# Patient Record
Sex: MALE | Race: WHITE | NOT HISPANIC OR LATINO | URBAN - METROPOLITAN AREA
[De-identification: names, ages, dates, MRNs, and addresses within clinical notes are randomized per-mention and may not be internally consistent; named-entity substitution may affect disease eponyms.]

---

## 2017-05-28 ENCOUNTER — COMMUNICATION - HEALTHEAST (OUTPATIENT)
Dept: FAMILY MEDICINE | Facility: CLINIC | Age: 37
End: 2017-05-28

## 2017-05-28 DIAGNOSIS — E03.9 UNSPECIFIED HYPOTHYROIDISM: ICD-10-CM

## 2017-05-28 DIAGNOSIS — F32.A DEPRESSION: ICD-10-CM

## 2017-05-30 ENCOUNTER — COMMUNICATION - HEALTHEAST (OUTPATIENT)
Dept: FAMILY MEDICINE | Facility: CLINIC | Age: 37
End: 2017-05-30

## 2017-05-30 DIAGNOSIS — M10.9 GOUT: ICD-10-CM

## 2017-07-05 ENCOUNTER — OFFICE VISIT - HEALTHEAST (OUTPATIENT)
Dept: FAMILY MEDICINE | Facility: CLINIC | Age: 37
End: 2017-07-05

## 2017-07-05 DIAGNOSIS — M10.9 GOUT: ICD-10-CM

## 2017-07-05 DIAGNOSIS — R19.5 CHANGE IN STOOL: ICD-10-CM

## 2017-07-05 DIAGNOSIS — E03.9 HYPOTHYROIDISM, UNSPECIFIED TYPE: ICD-10-CM

## 2017-07-05 DIAGNOSIS — F33.0 MILD EPISODE OF RECURRENT MAJOR DEPRESSIVE DISORDER (H): ICD-10-CM

## 2017-07-07 ENCOUNTER — COMMUNICATION - HEALTHEAST (OUTPATIENT)
Dept: FAMILY MEDICINE | Facility: CLINIC | Age: 37
End: 2017-07-07

## 2017-07-07 DIAGNOSIS — E03.9 UNSPECIFIED HYPOTHYROIDISM: ICD-10-CM

## 2017-07-07 DIAGNOSIS — F32.A DEPRESSION: ICD-10-CM

## 2018-03-31 ENCOUNTER — COMMUNICATION - HEALTHEAST (OUTPATIENT)
Dept: FAMILY MEDICINE | Facility: CLINIC | Age: 38
End: 2018-03-31

## 2018-03-31 DIAGNOSIS — M10.9 GOUT: ICD-10-CM

## 2018-04-01 ENCOUNTER — RECORDS - HEALTHEAST (OUTPATIENT)
Dept: ADMINISTRATIVE | Facility: OTHER | Age: 38
End: 2018-04-01

## 2018-06-14 ENCOUNTER — OFFICE VISIT - HEALTHEAST (OUTPATIENT)
Dept: FAMILY MEDICINE | Facility: CLINIC | Age: 38
End: 2018-06-14

## 2018-06-14 ENCOUNTER — RECORDS - HEALTHEAST (OUTPATIENT)
Dept: GENERAL RADIOLOGY | Facility: CLINIC | Age: 38
End: 2018-06-14

## 2018-06-14 DIAGNOSIS — E03.9 HYPOTHYROIDISM: ICD-10-CM

## 2018-06-14 DIAGNOSIS — M79.672 LEFT FOOT PAIN: ICD-10-CM

## 2018-06-14 DIAGNOSIS — M10.9 GOUT: ICD-10-CM

## 2018-06-14 DIAGNOSIS — M79.672 PAIN IN LEFT FOOT: ICD-10-CM

## 2018-06-14 DIAGNOSIS — F32.A DEPRESSION: ICD-10-CM

## 2018-06-14 LAB
TSH SERPL DL<=0.005 MIU/L-ACNC: 4.27 UIU/ML (ref 0.3–5)
URATE SERPL-MCNC: 9.5 MG/DL (ref 3–8)

## 2018-06-14 RX ORDER — LEVOTHYROXINE SODIUM 150 MCG
150 TABLET ORAL DAILY
Qty: 90 TABLET | Refills: 3 | Status: SHIPPED | OUTPATIENT
Start: 2018-06-14

## 2018-06-14 RX ORDER — VENLAFAXINE HYDROCHLORIDE 75 MG/1
75 CAPSULE, EXTENDED RELEASE ORAL DAILY
Qty: 90 CAPSULE | Refills: 3 | Status: SHIPPED | OUTPATIENT
Start: 2018-06-14

## 2018-06-14 ASSESSMENT — MIFFLIN-ST. JEOR: SCORE: 2306.06

## 2018-06-15 ENCOUNTER — COMMUNICATION - HEALTHEAST (OUTPATIENT)
Dept: FAMILY MEDICINE | Facility: CLINIC | Age: 38
End: 2018-06-15

## 2018-08-11 ENCOUNTER — COMMUNICATION - HEALTHEAST (OUTPATIENT)
Dept: FAMILY MEDICINE | Facility: CLINIC | Age: 38
End: 2018-08-11

## 2018-08-11 DIAGNOSIS — F32.A DEPRESSION: ICD-10-CM

## 2018-12-03 ENCOUNTER — RECORDS - HEALTHEAST (OUTPATIENT)
Dept: ADMINISTRATIVE | Facility: OTHER | Age: 38
End: 2018-12-03

## 2018-12-05 ENCOUNTER — COMMUNICATION - HEALTHEAST (OUTPATIENT)
Dept: ADMINISTRATIVE | Facility: CLINIC | Age: 38
End: 2018-12-05

## 2019-01-18 ENCOUNTER — OFFICE VISIT - HEALTHEAST (OUTPATIENT)
Dept: RHEUMATOLOGY | Facility: CLINIC | Age: 39
End: 2019-01-18

## 2019-01-18 DIAGNOSIS — M1A.0791 IDIOPATHIC CHRONIC GOUT OF FOOT WITH TOPHUS, UNSPECIFIED LATERALITY: ICD-10-CM

## 2019-01-18 LAB
ALT SERPL W P-5'-P-CCNC: 42 U/L (ref 0–45)
CREAT SERPL-MCNC: 0.81 MG/DL (ref 0.7–1.3)
ERYTHROCYTE [DISTWIDTH] IN BLOOD BY AUTOMATED COUNT: 12.6 % (ref 11–14.5)
GFR SERPL CREATININE-BSD FRML MDRD: >60 ML/MIN/1.73M2
HCT VFR BLD AUTO: 45.5 % (ref 40–54)
HGB BLD-MCNC: 15.3 G/DL (ref 14–18)
MCH RBC QN AUTO: 27.5 PG (ref 27–34)
MCHC RBC AUTO-ENTMCNC: 33.7 G/DL (ref 32–36)
MCV RBC AUTO: 82 FL (ref 80–100)
PLATELET # BLD AUTO: 177 THOU/UL (ref 140–440)
PMV BLD AUTO: 8.7 FL (ref 7–10)
RBC # BLD AUTO: 5.57 MILL/UL (ref 4.4–6.2)
URATE SERPL-MCNC: 7.4 MG/DL (ref 3–8)
WBC: 7.3 THOU/UL (ref 4–11)

## 2019-01-18 ASSESSMENT — MIFFLIN-ST. JEOR: SCORE: 2306.06

## 2019-03-07 ENCOUNTER — COMMUNICATION - HEALTHEAST (OUTPATIENT)
Dept: ADMINISTRATIVE | Facility: CLINIC | Age: 39
End: 2019-03-07

## 2019-04-22 ENCOUNTER — COMMUNICATION - HEALTHEAST (OUTPATIENT)
Dept: ADMINISTRATIVE | Facility: CLINIC | Age: 39
End: 2019-04-22

## 2019-04-22 DIAGNOSIS — M10.9 ACUTE GOUTY ARTHROPATHY: ICD-10-CM

## 2019-04-25 ENCOUNTER — OFFICE VISIT - HEALTHEAST (OUTPATIENT)
Dept: RHEUMATOLOGY | Facility: CLINIC | Age: 39
End: 2019-04-25

## 2019-04-25 DIAGNOSIS — M10.9 ACUTE GOUTY ARTHROPATHY: ICD-10-CM

## 2019-06-10 ENCOUNTER — AMBULATORY - HEALTHEAST (OUTPATIENT)
Dept: LAB | Facility: CLINIC | Age: 39
End: 2019-06-10

## 2019-06-10 DIAGNOSIS — M1A.0791 IDIOPATHIC CHRONIC GOUT OF FOOT WITH TOPHUS, UNSPECIFIED LATERALITY: ICD-10-CM

## 2019-06-10 LAB
ALT SERPL W P-5'-P-CCNC: 70 U/L (ref 0–45)
CREAT SERPL-MCNC: 0.98 MG/DL (ref 0.7–1.3)
ERYTHROCYTE [DISTWIDTH] IN BLOOD BY AUTOMATED COUNT: 12.8 % (ref 11–14.5)
GFR SERPL CREATININE-BSD FRML MDRD: >60 ML/MIN/1.73M2
HCT VFR BLD AUTO: 49.9 % (ref 40–54)
HGB BLD-MCNC: 16.7 G/DL (ref 14–18)
MCH RBC QN AUTO: 28.1 PG (ref 27–34)
MCHC RBC AUTO-ENTMCNC: 33.4 G/DL (ref 32–36)
MCV RBC AUTO: 84 FL (ref 80–100)
PLATELET # BLD AUTO: 188 THOU/UL (ref 140–440)
PMV BLD AUTO: 9.6 FL (ref 7–10)
RBC # BLD AUTO: 5.94 MILL/UL (ref 4.4–6.2)
URATE SERPL-MCNC: 6.5 MG/DL (ref 3–8)
WBC: 9.1 THOU/UL (ref 4–11)

## 2019-06-12 ENCOUNTER — OFFICE VISIT - HEALTHEAST (OUTPATIENT)
Dept: RHEUMATOLOGY | Facility: CLINIC | Age: 39
End: 2019-06-12

## 2019-06-12 DIAGNOSIS — R74.02 NONSPECIFIC ELEVATION OF LEVELS OF TRANSAMINASE OR LACTIC ACID DEHYDROGENASE (LDH): ICD-10-CM

## 2019-06-12 DIAGNOSIS — R74.01 NONSPECIFIC ELEVATION OF LEVELS OF TRANSAMINASE OR LACTIC ACID DEHYDROGENASE (LDH): ICD-10-CM

## 2019-06-12 DIAGNOSIS — M1A.0790 CHRONIC IDIOPATHIC GOUT OF FOOT, UNSPECIFIED LATERALITY: ICD-10-CM

## 2019-06-12 ASSESSMENT — MIFFLIN-ST. JEOR: SCORE: 2310.6

## 2019-07-10 ENCOUNTER — AMBULATORY - HEALTHEAST (OUTPATIENT)
Dept: LAB | Facility: CLINIC | Age: 39
End: 2019-07-10

## 2019-07-10 DIAGNOSIS — M1A.0791 IDIOPATHIC CHRONIC GOUT OF FOOT WITH TOPHUS, UNSPECIFIED LATERALITY: ICD-10-CM

## 2019-07-10 LAB
ALT SERPL W P-5'-P-CCNC: 85 U/L (ref 0–45)
CREAT SERPL-MCNC: 0.96 MG/DL (ref 0.7–1.3)
ERYTHROCYTE [DISTWIDTH] IN BLOOD BY AUTOMATED COUNT: 12.5 % (ref 11–14.5)
GFR SERPL CREATININE-BSD FRML MDRD: >60 ML/MIN/1.73M2
HCT VFR BLD AUTO: 48.8 % (ref 40–54)
HGB BLD-MCNC: 16.5 G/DL (ref 14–18)
MCH RBC QN AUTO: 28.6 PG (ref 27–34)
MCHC RBC AUTO-ENTMCNC: 33.9 G/DL (ref 32–36)
MCV RBC AUTO: 84 FL (ref 80–100)
PLATELET # BLD AUTO: 173 THOU/UL (ref 140–440)
PMV BLD AUTO: 9 FL (ref 7–10)
RBC # BLD AUTO: 5.78 MILL/UL (ref 4.4–6.2)
URATE SERPL-MCNC: 5.4 MG/DL (ref 3–8)
WBC: 7.6 THOU/UL (ref 4–11)

## 2019-08-03 ENCOUNTER — COMMUNICATION - HEALTHEAST (OUTPATIENT)
Dept: RHEUMATOLOGY | Facility: CLINIC | Age: 39
End: 2019-08-03

## 2019-08-03 DIAGNOSIS — M10.9 ACUTE GOUTY ARTHROPATHY: ICD-10-CM

## 2019-08-14 ENCOUNTER — AMBULATORY - HEALTHEAST (OUTPATIENT)
Dept: LAB | Facility: CLINIC | Age: 39
End: 2019-08-14

## 2019-08-14 DIAGNOSIS — M1A.0791 IDIOPATHIC CHRONIC GOUT OF FOOT WITH TOPHUS, UNSPECIFIED LATERALITY: ICD-10-CM

## 2019-08-14 LAB
ALT SERPL W P-5'-P-CCNC: 81 U/L (ref 0–45)
CREAT SERPL-MCNC: 0.85 MG/DL (ref 0.7–1.3)
ERYTHROCYTE [DISTWIDTH] IN BLOOD BY AUTOMATED COUNT: 12.5 % (ref 11–14.5)
GFR SERPL CREATININE-BSD FRML MDRD: >60 ML/MIN/1.73M2
HCT VFR BLD AUTO: 47.8 % (ref 40–54)
HGB BLD-MCNC: 16.4 G/DL (ref 14–18)
MCH RBC QN AUTO: 28.8 PG (ref 27–34)
MCHC RBC AUTO-ENTMCNC: 34.2 G/DL (ref 32–36)
MCV RBC AUTO: 84 FL (ref 80–100)
PLATELET # BLD AUTO: 199 THOU/UL (ref 140–440)
PMV BLD AUTO: 9.1 FL (ref 7–10)
RBC # BLD AUTO: 5.68 MILL/UL (ref 4.4–6.2)
URATE SERPL-MCNC: 5.3 MG/DL (ref 3–8)
WBC: 8.5 THOU/UL (ref 4–11)

## 2019-09-12 ENCOUNTER — AMBULATORY - HEALTHEAST (OUTPATIENT)
Dept: LAB | Facility: CLINIC | Age: 39
End: 2019-09-12

## 2019-09-12 DIAGNOSIS — M1A.0791 IDIOPATHIC CHRONIC GOUT OF FOOT WITH TOPHUS, UNSPECIFIED LATERALITY: ICD-10-CM

## 2019-09-12 LAB
ALT SERPL W P-5'-P-CCNC: 75 U/L (ref 0–45)
CREAT SERPL-MCNC: 0.87 MG/DL (ref 0.7–1.3)
ERYTHROCYTE [DISTWIDTH] IN BLOOD BY AUTOMATED COUNT: 12.5 % (ref 11–14.5)
GFR SERPL CREATININE-BSD FRML MDRD: >60 ML/MIN/1.73M2
HCT VFR BLD AUTO: 48.2 % (ref 40–54)
HGB BLD-MCNC: 16.8 G/DL (ref 14–18)
MCH RBC QN AUTO: 29.4 PG (ref 27–34)
MCHC RBC AUTO-ENTMCNC: 35 G/DL (ref 32–36)
MCV RBC AUTO: 84 FL (ref 80–100)
PLATELET # BLD AUTO: 180 THOU/UL (ref 140–440)
PMV BLD AUTO: 9.1 FL (ref 7–10)
RBC # BLD AUTO: 5.73 MILL/UL (ref 4.4–6.2)
URATE SERPL-MCNC: 4.2 MG/DL (ref 3–8)
WBC: 6.5 THOU/UL (ref 4–11)

## 2019-09-17 ENCOUNTER — OFFICE VISIT - HEALTHEAST (OUTPATIENT)
Dept: RHEUMATOLOGY | Facility: CLINIC | Age: 39
End: 2019-09-17

## 2019-09-17 DIAGNOSIS — R74.01 NONSPECIFIC ELEVATION OF LEVELS OF TRANSAMINASE OR LACTIC ACID DEHYDROGENASE (LDH): ICD-10-CM

## 2019-09-17 DIAGNOSIS — E66.01 MORBID OBESITY (H): ICD-10-CM

## 2019-09-17 DIAGNOSIS — R74.02 NONSPECIFIC ELEVATION OF LEVELS OF TRANSAMINASE OR LACTIC ACID DEHYDROGENASE (LDH): ICD-10-CM

## 2019-09-17 DIAGNOSIS — M1A.0790 CHRONIC IDIOPATHIC GOUT OF FOOT, UNSPECIFIED LATERALITY: ICD-10-CM

## 2019-09-17 ASSESSMENT — MIFFLIN-ST. JEOR: SCORE: 2369.57

## 2019-11-18 ENCOUNTER — AMBULATORY - HEALTHEAST (OUTPATIENT)
Dept: LAB | Facility: CLINIC | Age: 39
End: 2019-11-18

## 2019-11-18 DIAGNOSIS — M1A.0791 IDIOPATHIC CHRONIC GOUT OF FOOT WITH TOPHUS, UNSPECIFIED LATERALITY: ICD-10-CM

## 2019-11-18 LAB
ALT SERPL W P-5'-P-CCNC: 70 U/L (ref 0–45)
CREAT SERPL-MCNC: 1.17 MG/DL (ref 0.7–1.3)
ERYTHROCYTE [DISTWIDTH] IN BLOOD BY AUTOMATED COUNT: 11.7 % (ref 11–14.5)
GFR SERPL CREATININE-BSD FRML MDRD: >60 ML/MIN/1.73M2
HCT VFR BLD AUTO: 46.8 % (ref 40–54)
HGB BLD-MCNC: 16.4 G/DL (ref 14–18)
MCH RBC QN AUTO: 29.8 PG (ref 27–34)
MCHC RBC AUTO-ENTMCNC: 34.9 G/DL (ref 32–36)
MCV RBC AUTO: 85 FL (ref 80–100)
PLATELET # BLD AUTO: 150 THOU/UL (ref 140–440)
PMV BLD AUTO: 8.8 FL (ref 7–10)
RBC # BLD AUTO: 5.5 MILL/UL (ref 4.4–6.2)
URATE SERPL-MCNC: 5.3 MG/DL (ref 3–8)
WBC: 7.1 THOU/UL (ref 4–11)

## 2019-11-29 ENCOUNTER — COMMUNICATION - HEALTHEAST (OUTPATIENT)
Dept: RHEUMATOLOGY | Facility: CLINIC | Age: 39
End: 2019-11-29

## 2019-11-29 DIAGNOSIS — M1A.0790 CHRONIC IDIOPATHIC GOUT OF FOOT, UNSPECIFIED LATERALITY: ICD-10-CM

## 2020-01-20 ENCOUNTER — AMBULATORY - HEALTHEAST (OUTPATIENT)
Dept: LAB | Facility: CLINIC | Age: 40
End: 2020-01-20

## 2020-01-20 ENCOUNTER — COMMUNICATION - HEALTHEAST (OUTPATIENT)
Dept: RHEUMATOLOGY | Facility: CLINIC | Age: 40
End: 2020-01-20

## 2020-01-20 DIAGNOSIS — M1A.0790 CHRONIC IDIOPATHIC GOUT OF FOOT, UNSPECIFIED LATERALITY: ICD-10-CM

## 2020-01-20 DIAGNOSIS — M1A.0791 IDIOPATHIC CHRONIC GOUT OF FOOT WITH TOPHUS, UNSPECIFIED LATERALITY: ICD-10-CM

## 2020-01-20 LAB
ALT SERPL W P-5'-P-CCNC: 99 U/L (ref 0–45)
CREAT SERPL-MCNC: 1.12 MG/DL (ref 0.7–1.3)
ERYTHROCYTE [DISTWIDTH] IN BLOOD BY AUTOMATED COUNT: 12.5 % (ref 11–14.5)
GFR SERPL CREATININE-BSD FRML MDRD: >60 ML/MIN/1.73M2
HCT VFR BLD AUTO: 47 % (ref 40–54)
HGB BLD-MCNC: 15.6 G/DL (ref 14–18)
MCH RBC QN AUTO: 28.9 PG (ref 27–34)
MCHC RBC AUTO-ENTMCNC: 33.2 G/DL (ref 32–36)
MCV RBC AUTO: 87 FL (ref 80–100)
PLATELET # BLD AUTO: 161 THOU/UL (ref 140–440)
PMV BLD AUTO: 8.5 FL (ref 7–10)
RBC # BLD AUTO: 5.4 MILL/UL (ref 4.4–6.2)
URATE SERPL-MCNC: 4.7 MG/DL (ref 3–8)
WBC: 7.1 THOU/UL (ref 4–11)

## 2020-01-31 ENCOUNTER — OFFICE VISIT - HEALTHEAST (OUTPATIENT)
Dept: RHEUMATOLOGY | Facility: CLINIC | Age: 40
End: 2020-01-31

## 2020-01-31 DIAGNOSIS — E66.01 MORBID OBESITY (H): ICD-10-CM

## 2020-01-31 DIAGNOSIS — R74.02 NONSPECIFIC ELEVATION OF LEVELS OF TRANSAMINASE OR LACTIC ACID DEHYDROGENASE (LDH): ICD-10-CM

## 2020-01-31 DIAGNOSIS — R74.01 NONSPECIFIC ELEVATION OF LEVELS OF TRANSAMINASE OR LACTIC ACID DEHYDROGENASE (LDH): ICD-10-CM

## 2020-01-31 DIAGNOSIS — M1A.0790 CHRONIC IDIOPATHIC GOUT OF FOOT, UNSPECIFIED LATERALITY: ICD-10-CM

## 2020-01-31 ASSESSMENT — MIFFLIN-ST. JEOR: SCORE: 2424

## 2020-03-08 ENCOUNTER — COMMUNICATION - HEALTHEAST (OUTPATIENT)
Dept: RHEUMATOLOGY | Facility: CLINIC | Age: 40
End: 2020-03-08

## 2020-03-08 DIAGNOSIS — M10.9 ACUTE GOUTY ARTHROPATHY: ICD-10-CM

## 2020-03-24 ENCOUNTER — AMBULATORY - HEALTHEAST (OUTPATIENT)
Dept: RHEUMATOLOGY | Facility: CLINIC | Age: 40
End: 2020-03-24

## 2020-03-24 ENCOUNTER — MEDICAL CORRESPONDENCE (OUTPATIENT)
Dept: HEALTH INFORMATION MANAGEMENT | Facility: CLINIC | Age: 40
End: 2020-03-24

## 2020-03-24 DIAGNOSIS — M1A.0790 CHRONIC IDIOPATHIC GOUT OF FOOT, UNSPECIFIED LATERALITY: ICD-10-CM

## 2020-04-01 ENCOUNTER — COMMUNICATION - HEALTHEAST (OUTPATIENT)
Dept: ADMINISTRATIVE | Facility: CLINIC | Age: 40
End: 2020-04-01

## 2020-04-02 ENCOUNTER — AMBULATORY - HEALTHEAST (OUTPATIENT)
Dept: LAB | Facility: CLINIC | Age: 40
End: 2020-04-02

## 2020-04-02 DIAGNOSIS — M1A.0790 CHRONIC IDIOPATHIC GOUT OF FOOT, UNSPECIFIED LATERALITY: ICD-10-CM

## 2020-04-02 LAB
ALBUMIN SERPL-MCNC: 3.8 G/DL (ref 3.5–5)
ALT SERPL W P-5'-P-CCNC: 92 U/L (ref 0–45)
CREAT SERPL-MCNC: 0.79 MG/DL (ref 0.7–1.3)
ERYTHROCYTE [DISTWIDTH] IN BLOOD BY AUTOMATED COUNT: 11.7 % (ref 11–14.5)
GFR SERPL CREATININE-BSD FRML MDRD: >60 ML/MIN/1.73M2
HCT VFR BLD AUTO: 48.1 % (ref 40–54)
HGB BLD-MCNC: 16.3 G/DL (ref 14–18)
MCH RBC QN AUTO: 28.8 PG (ref 27–34)
MCHC RBC AUTO-ENTMCNC: 33.8 G/DL (ref 32–36)
MCV RBC AUTO: 85 FL (ref 80–100)
PLATELET # BLD AUTO: 171 THOU/UL (ref 140–440)
PMV BLD AUTO: 8.8 FL (ref 7–10)
RBC # BLD AUTO: 5.65 MILL/UL (ref 4.4–6.2)
URATE SERPL-MCNC: 4.4 MG/DL (ref 3–8)
WBC: 6.6 THOU/UL (ref 4–11)

## 2020-06-04 ENCOUNTER — AMBULATORY - HEALTHEAST (OUTPATIENT)
Dept: LAB | Facility: CLINIC | Age: 40
End: 2020-06-04

## 2020-06-04 DIAGNOSIS — M1A.0790 CHRONIC IDIOPATHIC GOUT OF FOOT, UNSPECIFIED LATERALITY: ICD-10-CM

## 2020-06-04 LAB
ALBUMIN SERPL-MCNC: 3.9 G/DL (ref 3.5–5)
ALT SERPL W P-5'-P-CCNC: 95 U/L (ref 0–45)
CREAT SERPL-MCNC: 0.95 MG/DL (ref 0.7–1.3)
ERYTHROCYTE [DISTWIDTH] IN BLOOD BY AUTOMATED COUNT: 13 % (ref 11–14.5)
GFR SERPL CREATININE-BSD FRML MDRD: >60 ML/MIN/1.73M2
HCT VFR BLD AUTO: 46.6 % (ref 40–54)
HGB BLD-MCNC: 16.2 G/DL (ref 14–18)
MCH RBC QN AUTO: 28.9 PG (ref 27–34)
MCHC RBC AUTO-ENTMCNC: 34.8 G/DL (ref 32–36)
MCV RBC AUTO: 83 FL (ref 80–100)
PLATELET # BLD AUTO: 172 THOU/UL (ref 140–440)
PMV BLD AUTO: 8.8 FL (ref 7–10)
RBC # BLD AUTO: 5.6 MILL/UL (ref 4.4–6.2)
URATE SERPL-MCNC: 4.9 MG/DL (ref 3–8)
WBC: 8.6 THOU/UL (ref 4–11)

## 2020-08-06 ENCOUNTER — AMBULATORY - HEALTHEAST (OUTPATIENT)
Dept: LAB | Facility: CLINIC | Age: 40
End: 2020-08-06

## 2020-08-06 DIAGNOSIS — M1A.0790 CHRONIC IDIOPATHIC GOUT OF FOOT, UNSPECIFIED LATERALITY: ICD-10-CM

## 2020-08-06 LAB
ALBUMIN SERPL-MCNC: 4 G/DL (ref 3.5–5)
ALT SERPL W P-5'-P-CCNC: 112 U/L (ref 0–45)
CREAT SERPL-MCNC: 0.88 MG/DL (ref 0.7–1.3)
ERYTHROCYTE [DISTWIDTH] IN BLOOD BY AUTOMATED COUNT: 13.3 % (ref 11–14.5)
GFR SERPL CREATININE-BSD FRML MDRD: >60 ML/MIN/1.73M2
HCT VFR BLD AUTO: 45.3 % (ref 40–54)
HGB BLD-MCNC: 15.9 G/DL (ref 14–18)
MCH RBC QN AUTO: 29.2 PG (ref 27–34)
MCHC RBC AUTO-ENTMCNC: 35 G/DL (ref 32–36)
MCV RBC AUTO: 83 FL (ref 80–100)
PLATELET # BLD AUTO: 171 THOU/UL (ref 140–440)
PMV BLD AUTO: 9.2 FL (ref 7–10)
RBC # BLD AUTO: 5.44 MILL/UL (ref 4.4–6.2)
URATE SERPL-MCNC: 5.3 MG/DL (ref 3–8)
WBC: 6.7 THOU/UL (ref 4–11)

## 2020-08-10 ENCOUNTER — OFFICE VISIT - HEALTHEAST (OUTPATIENT)
Dept: RHEUMATOLOGY | Facility: CLINIC | Age: 40
End: 2020-08-10

## 2020-08-10 DIAGNOSIS — E66.01 MORBID OBESITY (H): ICD-10-CM

## 2020-08-10 DIAGNOSIS — R74.01 NONSPECIFIC ELEVATION OF LEVELS OF TRANSAMINASE OR LACTIC ACID DEHYDROGENASE (LDH): ICD-10-CM

## 2020-08-10 DIAGNOSIS — R74.02 NONSPECIFIC ELEVATION OF LEVELS OF TRANSAMINASE OR LACTIC ACID DEHYDROGENASE (LDH): ICD-10-CM

## 2020-08-10 DIAGNOSIS — M1A.0790 CHRONIC IDIOPATHIC GOUT OF FOOT, UNSPECIFIED LATERALITY: ICD-10-CM

## 2021-02-11 ENCOUNTER — OFFICE VISIT - HEALTHEAST (OUTPATIENT)
Dept: RHEUMATOLOGY | Facility: CLINIC | Age: 41
End: 2021-02-11

## 2021-02-11 DIAGNOSIS — M1A.0790 CHRONIC IDIOPATHIC GOUT OF FOOT, UNSPECIFIED LATERALITY: ICD-10-CM

## 2021-02-11 DIAGNOSIS — R74.01 NONSPECIFIC ELEVATION OF LEVELS OF TRANSAMINASE OR LACTIC ACID DEHYDROGENASE (LDH): ICD-10-CM

## 2021-02-11 DIAGNOSIS — R74.02 NONSPECIFIC ELEVATION OF LEVELS OF TRANSAMINASE OR LACTIC ACID DEHYDROGENASE (LDH): ICD-10-CM

## 2021-02-11 DIAGNOSIS — E66.01 MORBID OBESITY (H): ICD-10-CM

## 2021-02-11 RX ORDER — ALLOPURINOL 300 MG/1
TABLET ORAL
Qty: 180 TABLET | Refills: 0 | Status: SHIPPED | OUTPATIENT
Start: 2021-02-11

## 2021-02-18 ENCOUNTER — AMBULATORY - HEALTHEAST (OUTPATIENT)
Dept: LAB | Facility: CLINIC | Age: 41
End: 2021-02-18

## 2021-02-18 DIAGNOSIS — R74.01 NONSPECIFIC ELEVATION OF LEVELS OF TRANSAMINASE OR LACTIC ACID DEHYDROGENASE (LDH): ICD-10-CM

## 2021-02-18 DIAGNOSIS — R74.02 NONSPECIFIC ELEVATION OF LEVELS OF TRANSAMINASE OR LACTIC ACID DEHYDROGENASE (LDH): ICD-10-CM

## 2021-02-18 DIAGNOSIS — M1A.0790 CHRONIC IDIOPATHIC GOUT OF FOOT, UNSPECIFIED LATERALITY: ICD-10-CM

## 2021-02-18 LAB
ALT SERPL W P-5'-P-CCNC: 58 U/L (ref 0–45)
CREAT SERPL-MCNC: 0.85 MG/DL (ref 0.7–1.3)
ERYTHROCYTE [DISTWIDTH] IN BLOOD BY AUTOMATED COUNT: 13.1 % (ref 11–14.5)
GFR SERPL CREATININE-BSD FRML MDRD: >60 ML/MIN/1.73M2
HCT VFR BLD AUTO: 46.1 % (ref 40–54)
HGB BLD-MCNC: 16 G/DL (ref 14–18)
MCH RBC QN AUTO: 28.4 PG (ref 27–34)
MCHC RBC AUTO-ENTMCNC: 34.7 G/DL (ref 32–36)
MCV RBC AUTO: 82 FL (ref 80–100)
PLATELET # BLD AUTO: 189 THOU/UL (ref 140–440)
PMV BLD AUTO: 11.9 FL (ref 7–10)
RBC # BLD AUTO: 5.64 MILL/UL (ref 4.4–6.2)
URATE SERPL-MCNC: 4.6 MG/DL (ref 3–8)
WBC: 7.5 THOU/UL (ref 4–11)

## 2021-03-03 ENCOUNTER — COMMUNICATION - HEALTHEAST (OUTPATIENT)
Dept: RHEUMATOLOGY | Facility: CLINIC | Age: 41
End: 2021-03-03

## 2021-04-20 ENCOUNTER — AMBULATORY - HEALTHEAST (OUTPATIENT)
Dept: NURSING | Facility: CLINIC | Age: 41
End: 2021-04-20

## 2021-05-11 ENCOUNTER — AMBULATORY - HEALTHEAST (OUTPATIENT)
Dept: NURSING | Facility: CLINIC | Age: 41
End: 2021-05-11

## 2021-05-28 NOTE — PROGRESS NOTES
ASSESSMENT AND PLAN:  Freedom Schmitz 38 y.o. male is seen here on 04/25/19 for follow-up sooner than expected for acute onset of pain in his left knee which has features of an acute inflammatory monoarthritis, in the background of gout he was quite severely started on prednisone, he is already made 80% improvement.  Seems like he ran out of colchicine we discussed this today, continue for at least a year if not longer..  Return as previously scheduled.  Diagnoses and all orders for this visit:    Acute Gout  -     colchicine (COLCRYS) 0.6 mg tablet; Take 1 tablet (0.6 mg total) by mouth daily.  Dispense: 90 tablet; Refill: 0  -     predniSONE (DELTASONE) 10 mg tablet; Take 30 mg by mouth daily for 10 days.  Dispense: 30 tablet; Refill: 0      HISTORY OF PRESENTING ILLNESS:  Freedom Schmitz, 38 y.o., male is here for severe exacerbation of pain affecting left knee, to the point where he was unable to go to work for the past 3 days.  It began over the weekend gradually and then released to flareup within 24 hours.  This was anteroposterior area pain in the left knee.  It is swollen.  He was started on prednisone.  This is improved by 80%.  He was seen here earlier this year when he had reported joint pains having began 2 years ago when he was 36.  He recalls that his right foot big toe had become acutely inflamed so much so that he could not put on his usual shoes and went to the emergency room in slippers.  They both recall to this day how the ER physician there almost made fun of his slippers telling him to wear proper shoes and not realizing how painful and swollen his foot was.  Since then he has had episodes usually in fall and winter.  However over the past several months he has been getting almost weekly flareups.  He has had these and the first MTPs and at times in the ankles and knees.  He has never had involvement of the upper extremity joints.  He is kidney stone but is not sure the composition of that.   He reports no family history of gout.  No family history of lupus rheumatoid arthritis ankylosing spondylitis.  Today is a good day for him although mild discomfort yet in the right knee.  He has been able to continue most of his day-to-day activities with some exceptions as noted.  He reports fatigue he is not a smoker he has given up on many of the dietary ingredients such as soda, or any juices, lobster, and others.  He takes minimal alcohol intake.  Further historical information and ADL limitations as noted in the multidimensional health assessment questionnaire attached in the EMR.      ALLERGIES:Patient has no known allergies.    PAST MEDICAL/ACTIVE PROBLEMS/MEDICATION/ FAMILY HISTORY/SOCIAL DATA:  The patient has a family history of  Past Medical History:   Diagnosis Date     Depression      Social History     Tobacco Use   Smoking Status Never Smoker   Smokeless Tobacco Never Used     Patient Active Problem List   Diagnosis     Epistaxis     Serum Enzyme Levels - ALT (SGPT) Elevated     Blood Pressure Isolated Elevated     Hypothyroidism     Essential Hypercholesterolemia     Morbid Obesity     Major Depression, Recurrent     Obstructive Sleep Apnea     Asymptomatic Hyperuricemia     Snoring (Symptom)     Acute Gout     Anxiety     Pes planus (flat feet)     Gout     Left foot pain     Depression     Current Outpatient Medications   Medication Sig Dispense Refill     allopurinol (ZYLOPRIM) 300 MG tablet Take 400 mg by mouth daily.       predniSONE (DELTASONE) 10 mg tablet Take 30 mg by mouth daily for 10 days. 30 tablet 0     SYNTHROID 150 mcg tablet Take 1 tablet (150 mcg total) by mouth daily. As directed 90 tablet 3     venlafaxine (EFFEXOR-XR) 75 MG 24 hr capsule Take 1 capsule (75 mg total) by mouth daily. 90 capsule 3     indomethacin (INDOCIN) 50 MG capsule TAKE 1 CAPSULE(50 MG) BY MOUTH THREE TIMES DAILY WITH FOOD AS NEEDED 30 capsule 1     No current facility-administered medications for this  visit.      DETAILED EXAMINATION  04/25/19  :  Vitals:    04/25/19 1042   BP: 136/84   Resp: 18   Weight: (!) 295 lb (133.8 kg)     Alert oriented. Head including the face is examined for malar rash, heliotropes, scarring, lupus pernio. Eyes examined for redness such as in episcleritis/scleritis, periorbital lesions.   Neck/ Face examined for parotid gland swelling, range of motion of neck.  Left upper and lower and right upper and lower extremities examined for tenderness, swelling, warmth of the appendicular joints, range of motion, edema, rash.  Some of the important findings included: Left knee warmth, minimal effusion.    LAB / IMAGING DATA:  ALT   Date Value Ref Range Status   01/18/2019 42 0 - 45 U/L Final   05/25/2016 38 0 - 45 U/L Final   11/13/2013 42 <46 IU/L Final     Albumin   Date Value Ref Range Status   05/25/2016 3.7 3.5 - 5.0 g/dL Final   11/13/2013 4.1 3.5 - 5.0 g/dL Final   11/02/2012 4.1 3.5 - 5.0 g/dL Final     Creatinine   Date Value Ref Range Status   01/18/2019 0.81 0.70 - 1.30 mg/dL Final   07/05/2017 0.86 0.70 - 1.30 mg/dL Final   05/25/2016 0.84 0.70 - 1.30 mg/dL Final       WBC   Date Value Ref Range Status   01/18/2019 7.3 4.0 - 11.0 thou/uL Final   07/05/2017 6.7 4.0 - 11.0 thou/uL Final   04/16/2015 7.2 4.0 - 11.0 thou/uL Final     Hemoglobin   Date Value Ref Range Status   01/18/2019 15.3 14.0 - 18.0 g/dL Final   07/05/2017 16.9 14.0 - 18.0 g/dL Final   05/25/2016 16.3 14.0 - 18.0 g/dL Final     Platelets   Date Value Ref Range Status   01/18/2019 177 140 - 440 thou/uL Final   07/05/2017 151 140 - 440 thou/uL Final   05/25/2016 171 140 - 440 thou/uL Final       No results found for: JORDAN, RF, SEDRATE

## 2021-05-28 NOTE — TELEPHONE ENCOUNTER
He can't bend his knee and was hoping to get in to see Dr. Shasha carter. No openings w/Dr. Pulliam until Thursday 04.25.2019.    He needs advice on what to do until Thursday if he can't be seen any sooner.     Freedom @ [phone]

## 2021-05-28 NOTE — TELEPHONE ENCOUNTER
Pt states he is having gout flare up in his knee, not much swelling but painful to walk on and cannot bend knee. Pt has appt on Thursday with Dr Pulliam and was just seen in [facility]- they prescribed Indomethacin. Dr Pulliam said he can prescribe prednisone 30mg daily for 10 days and pt should keep appt on Thursday with him. Pt should not take prednisone with the Indomethacin, pt should take one or the other as it can increase GI upset/GI bleeds. Pt verbalized understanding and will take the prednisone and not the indomethacin. rx sent to [facility] for prednisone.

## 2021-05-29 NOTE — PROGRESS NOTES
ASSESSMENT AND PLAN:  Freedom Schmizt 38 y.o. male is seen here on 06/12/19 for follow-up.  He has chronic gout typically affecting his toes, recent knee involvement, left-sided.  He has had no recurrence or flareup since his most recent visit.  While his serum urate has improved it is not quite in the target range.  His ALT elevation other than BMI no immediately obvious reason for this.  In view of interaction with allopurinol recommended checking labs every 4 weeks.  This will be until the liver function improves or becomes normal.  Meanwhile he will check with his primary physician on this.  He is going to increase the dose of allopurinol to 600 mg/day.  Continue colchicine as now.  Follow-up here in 3 months.      Diagnoses and all orders for this visit:    Chronic idiopathic gout of foot, unspecified laterality  -     Discontinue: allopurinol (ZYLOPRIM) 300 MG tablet; Take 2 tablets (600 mg total) by mouth daily.  Dispense: 180 tablet; Refill: 0  -     allopurinol (ZYLOPRIM) 300 MG tablet; Take 2 tablets (600 mg total) by mouth daily.  Dispense: 180 tablet; Refill: 0  -     colchicine (COLCRYS) 0.6 mg tablet; Take 1 tablet (0.6 mg total) by mouth daily.  Dispense: 90 tablet; Refill: 0    Serum Enzyme Levels - ALT (SGPT) Elevated      HISTORY OF PRESENTING ILLNESS:  Freedom Schmitz, 38 y.o., male is here for follow-up.  He has chronic intermittent gout.  He has not had a flareup since his recent visit.He reports that the symptoms began 2 years ago when he was 36.  He recalls that his right foot big toe had become acutely inflamed so much so that he could not put on his usual shoes and went to the emergency room in slippers.  They both recall to this day how the ER physician there almost made fun of his slippers telling him to wear proper shoes and not realizing how painful and swollen his foot was.  Since then he has had episodes usually in fall and winter.  However over the past several months he has been  getting almost weekly flareups.  He has had these and the first MTPs and at times in the ankles and knees.  He has never had involvement of the upper extremity joints.  He is kidney stone but is not sure the composition of that.  He reports no family history of gout.   He takes minimal alcohol intake.  Further historical information and ADL limitations as noted in the multidimensional health assessment questionnaire attached in the EMR.      ALLERGIES:Patient has no known allergies.    PAST MEDICAL/ACTIVE PROBLEMS/MEDICATION/ FAMILY HISTORY/SOCIAL DATA:  The patient has a family history of  Past Medical History:   Diagnosis Date     Depression      Social History     Tobacco Use   Smoking Status Never Smoker   Smokeless Tobacco Never Used     Patient Active Problem List   Diagnosis     Epistaxis     Serum Enzyme Levels - ALT (SGPT) Elevated     Blood Pressure Isolated Elevated     Hypothyroidism     Essential Hypercholesterolemia     Morbid Obesity     Major Depression, Recurrent     Obstructive Sleep Apnea     Asymptomatic Hyperuricemia     Snoring (Symptom)     Acute Gout     Anxiety     Pes planus (flat feet)     Gout     Left foot pain     Depression     Current Outpatient Medications   Medication Sig Dispense Refill     allopurinol (ZYLOPRIM) 300 MG tablet Take 400 mg by mouth daily.       colchicine (COLCRYS) 0.6 mg tablet Take 1 tablet (0.6 mg total) by mouth daily. 90 tablet 0     SYNTHROID 150 mcg tablet Take 1 tablet (150 mcg total) by mouth daily. As directed 90 tablet 3     venlafaxine (EFFEXOR-XR) 75 MG 24 hr capsule Take 1 capsule (75 mg total) by mouth daily. 90 capsule 3     No current facility-administered medications for this visit.      DETAILED EXAMINATION  06/12/19  :  There were no vitals filed for this visit.  Alert oriented. Head including the face is examined for malar rash, heliotropes, scarring, lupus pernio. Eyes examined for redness such as in episcleritis/scleritis, periorbital lesions.    Neck/ Face examined for parotid gland swelling, range of motion of neck.  Left upper and lower and right upper and lower extremities examined for tenderness, swelling, warmth of the appendicular joints, range of motion, edema, rash.  Some of the important findings included: None of the palpable joints of upper and lower extremities are acutely inflamed.  LAB / IMAGING DATA:  ALT   Date Value Ref Range Status   06/10/2019 70 (H) 0 - 45 U/L Final   01/18/2019 42 0 - 45 U/L Final   05/25/2016 38 0 - 45 U/L Final     Albumin   Date Value Ref Range Status   05/25/2016 3.7 3.5 - 5.0 g/dL Final   11/13/2013 4.1 3.5 - 5.0 g/dL Final   11/02/2012 4.1 3.5 - 5.0 g/dL Final     Creatinine   Date Value Ref Range Status   06/10/2019 0.98 0.70 - 1.30 mg/dL Final   01/18/2019 0.81 0.70 - 1.30 mg/dL Final   07/05/2017 0.86 0.70 - 1.30 mg/dL Final       WBC   Date Value Ref Range Status   06/10/2019 9.1 4.0 - 11.0 thou/uL Final   01/18/2019 7.3 4.0 - 11.0 thou/uL Final   04/16/2015 7.2 4.0 - 11.0 thou/uL Final     Hemoglobin   Date Value Ref Range Status   06/10/2019 16.7 14.0 - 18.0 g/dL Final   01/18/2019 15.3 14.0 - 18.0 g/dL Final   07/05/2017 16.9 14.0 - 18.0 g/dL Final     Platelets   Date Value Ref Range Status   06/10/2019 188 140 - 440 thou/uL Final   01/18/2019 177 140 - 440 thou/uL Final   07/05/2017 151 140 - 440 thou/uL Final       No results found for: JORDAN, RF, SEDRATE

## 2021-05-31 VITALS — BODY MASS INDEX: 41.73 KG/M2 | WEIGHT: 295 LBS

## 2021-06-01 VITALS — WEIGHT: 304 LBS | BODY MASS INDEX: 42.56 KG/M2 | HEIGHT: 71 IN

## 2021-06-01 NOTE — PROGRESS NOTES
ASSESSMENT AND PLAN:  Freedom Schmitz 39 y.o. male is seen here on 09/17/19 for follow-up of gout, affecting lower extremity joints, modest elevation of ALT at 75, his allopurinol 600 mg daily has now brought his serum urate in target range.  There has been no flareup.  He is on colchicine 0.6 mg daily.  He has modest elevation of ALT.  This is remained essentially the same range between June and now.  He does not take alcohol in any significant quantities.  He is going to have a physical coming up and is going to discuss this with his primary physician.  Besides pharmacologic etiology other such as the BMI is contribution were outlined.  We will meet here in 4months with labs every 2 months.            Diagnoses and all orders for this visit:    Chronic idiopathic gout of foot, unspecified laterality  -     allopurinol (ZYLOPRIM) 300 MG tablet; Take 2 tablets (600 mg total) by mouth daily.  Dispense: 180 tablet; Refill: 0  -     colchicine (COLCRYS) 0.6 mg tablet; Take 1 tablet (0.6 mg total) by mouth daily.  Dispense: 90 tablet; Refill: 0    Serum Enzyme Levels - ALT (SGPT) Elevated    Morbid Obesity      HISTORY OF PRESENTING ILLNESS:  Freedom Schmitz, 39 y.o., male is here for follow-up.  He has chronic intermittent gout.  He has not had a flareup since his recent visit.his ALT has been modestly elevated.  He does not take alcohol.  This is discussed with him today.  He is going to physical in the next few weeks and is going to discuss this with his primary physician.  Besides his current medication steatohepatitis and other potential etiologies discussed.  His serum urate is at target range of 4.2.  He reports that the symptoms began 2 years ago when he was 36.  He recalls that his right foot big toe had become acutely inflamed so much so that he could not put on his usual shoes and went to the emergency room in slippers.  They both recall to this day how the ER physician there almost made fun of his slippers  telling him to wear proper shoes and not realizing how painful and swollen his foot was.  Since then he has had episodes usually in fall and winter.  However over the past several months he has been getting almost weekly flareups.  He has had these and the first MTPs and at times in the ankles and knees.  He has never had involvement of the upper extremity joints.  He is kidney stone but is not sure the composition of that.  He reports no family history of gout.   He takes minimal alcohol intake.  Further historical information and ADL limitations as noted in the multidimensional health assessment questionnaire attached in the EMR.      ALLERGIES:Patient has no known allergies.    PAST MEDICAL/ACTIVE PROBLEMS/MEDICATION/ FAMILY HISTORY/SOCIAL DATA:  The patient has a family history of  Past Medical History:   Diagnosis Date     Depression      Social History     Tobacco Use   Smoking Status Never Smoker   Smokeless Tobacco Never Used     Patient Active Problem List   Diagnosis     Epistaxis     Serum Enzyme Levels - ALT (SGPT) Elevated     Blood Pressure Isolated Elevated     Hypothyroidism     Essential Hypercholesterolemia     Morbid Obesity     Major Depression, Recurrent     Obstructive Sleep Apnea     Snoring (Symptom)     Anxiety     Pes planus (flat feet)     Depression     Chronic idiopathic gout of foot, unspecified laterality     Current Outpatient Medications   Medication Sig Dispense Refill     allopurinol (ZYLOPRIM) 300 MG tablet Take 2 tablets (600 mg total) by mouth daily. 180 tablet 0     SYNTHROID 150 mcg tablet Take 1 tablet (150 mcg total) by mouth daily. As directed 90 tablet 3     venlafaxine (EFFEXOR-XR) 75 MG 24 hr capsule Take 1 capsule (75 mg total) by mouth daily. 90 capsule 3     colchicine (COLCRYS) 0.6 mg tablet Take 1 tablet (0.6 mg total) by mouth daily. 90 tablet 0     No current facility-administered medications for this visit.      DETAILED EXAMINATION  09/17/19  :  Vitals:     "09/17/19 0739   BP: 118/70   Weight: (!) 318 lb (144.2 kg)   Height: 5' 11\" (1.803 m)     Alert oriented. Head including the face is examined for malar rash, heliotropes, scarring, lupus pernio. Eyes examined for redness such as in episcleritis/scleritis, periorbital lesions.   Neck/ Face examined for parotid gland swelling, range of motion of neck.  Left upper and lower and right upper and lower extremities examined for tenderness, swelling, warmth of the appendicular joints, range of motion, edema, rash.  Some of the important findings included: No acute inflammation of upper and lower extremity palpable joints.  No tophi is visible.    LAB / IMAGING DATA:  ALT   Date Value Ref Range Status   09/12/2019 75 (H) 0 - 45 U/L Final   08/14/2019 81 (H) 0 - 45 U/L Final   07/10/2019 85 (H) 0 - 45 U/L Final     Albumin   Date Value Ref Range Status   05/25/2016 3.7 3.5 - 5.0 g/dL Final   11/13/2013 4.1 3.5 - 5.0 g/dL Final   11/02/2012 4.1 3.5 - 5.0 g/dL Final     Creatinine   Date Value Ref Range Status   09/12/2019 0.87 0.70 - 1.30 mg/dL Final   08/14/2019 0.85 0.70 - 1.30 mg/dL Final   07/10/2019 0.96 0.70 - 1.30 mg/dL Final       WBC   Date Value Ref Range Status   09/12/2019 6.5 4.0 - 11.0 thou/uL Final   08/14/2019 8.5 4.0 - 11.0 thou/uL Final   04/16/2015 7.2 4.0 - 11.0 thou/uL Final     Hemoglobin   Date Value Ref Range Status   09/12/2019 16.8 14.0 - 18.0 g/dL Final   08/14/2019 16.4 14.0 - 18.0 g/dL Final   07/10/2019 16.5 14.0 - 18.0 g/dL Final     Platelets   Date Value Ref Range Status   09/12/2019 180 140 - 440 thou/uL Final   08/14/2019 199 140 - 440 thou/uL Final   07/10/2019 173 140 - 440 thou/uL Final       No results found for: JORDAN, RF, SEDRATE       "

## 2021-06-02 VITALS — BODY MASS INDEX: 42.56 KG/M2 | HEIGHT: 71 IN | WEIGHT: 304 LBS

## 2021-06-03 VITALS — BODY MASS INDEX: 42.7 KG/M2 | HEIGHT: 71 IN | WEIGHT: 305 LBS

## 2021-06-03 VITALS
WEIGHT: 315 LBS | SYSTOLIC BLOOD PRESSURE: 118 MMHG | HEIGHT: 71 IN | BODY MASS INDEX: 44.1 KG/M2 | DIASTOLIC BLOOD PRESSURE: 70 MMHG

## 2021-06-03 VITALS — WEIGHT: 295 LBS | BODY MASS INDEX: 41.14 KG/M2

## 2021-06-04 VITALS
DIASTOLIC BLOOD PRESSURE: 80 MMHG | SYSTOLIC BLOOD PRESSURE: 132 MMHG | HEIGHT: 71 IN | WEIGHT: 315 LBS | BODY MASS INDEX: 44.1 KG/M2

## 2021-06-05 NOTE — PROGRESS NOTES
ASSESSMENT AND PLAN:  Freedom Schmitz 39 y.o. male is seen here on 01/31/20 for follow-up.  He has gout, based on clinical diagnosis, no recurrence of original symptoms, serum urate in target range with allopurinol 600 mg daily.  He has tolerated this well.  With the background of his BMI at 46.03 he has been diagnosed with fatty liver had an ultrasound examination done at his primary physician's office, he has abnormal ALT as far back as 2010 when it was 73.  Most recent number is at 99.  He does not take alcohol.  He is working on his weight.  Recently there is been more stress of moving the home.  Reduce colchicine dose in view of the worsening ALT.  We will meet here in 6 months but keep checking labs every 2 months.               Diagnoses and all orders for this visit:    Chronic idiopathic gout of foot, unspecified laterality  -     allopurinoL (ZYLOPRIM) 300 MG tablet; TAKE 2 TABLETS(600 MG) BY MOUTH DAILY  Dispense: 180 tablet; Refill: 0  -     colchicine (COLCRYS) 0.6 mg tablet; Take 1 tablet (0.6 mg total) by mouth every other day.  Dispense: 45 tablet; Refill: 0    Serum Enzyme Levels - ALT (SGPT) Elevated    Morbid Obesity      HISTORY OF PRESENTING ILLNESS:  Freedom Schmitz, 39 y.o., male is here for follow-up.  He has chronic intermittent gout.  He has not had a flareup since his recent visit.his serum urate is in target range.  For his chronically elevated liver function studies such as the ALT going as far back as 2010, he has had work-up done at his primary physician's office he had an ultrasound done since his previous visit and recalls a diagnosis of fatty liver was arrived that.  He is trying to cut back on weight.  He noted overall some discomfort in his left shoulder 0.5/10 pain, no stiffness in the morning.  He has not had kidney stones.  His ALT has been modestly elevated.  He does not take alcohol.  This is discussed with him today.  He is going to physical in the next few weeks and is  going to discuss this with his primary physician.  Besides his current medication steatohepatitis and other potential etiologies discussed.  His serum urate is at target range of 4.2.  He reports that the symptoms began 2 years ago when he was 36.  He recalls that his right foot big toe had become acutely inflamed so much so that he could not put on his usual shoes and went to the emergency room in slippers.  They both recall to this day how the ER physician there almost made fun of his slippers telling him to wear proper shoes and not realizing how painful and swollen his foot was.  Since then he has had episodes usually in fall and winter.  However over the past several months he has been getting almost weekly flareups.  He has had these and the first MTPs and at times in the ankles and knees.  He has never had involvement of the upper extremity joints.  He is kidney stone but is not sure the composition of that.  He reports no family history of gout.   He takes minimal alcohol intake.  Further historical information and ADL limitations as noted in the multidimensional health assessment questionnaire attached in the EMR.      ALLERGIES:Patient has no known allergies.    PAST MEDICAL/ACTIVE PROBLEMS/MEDICATION/ FAMILY HISTORY/SOCIAL DATA:  The patient has a family history of  Past Medical History:   Diagnosis Date     Depression      Social History     Tobacco Use   Smoking Status Never Smoker   Smokeless Tobacco Never Used     Patient Active Problem List   Diagnosis     Epistaxis     Serum Enzyme Levels - ALT (SGPT) Elevated     Blood Pressure Isolated Elevated     Hypothyroidism     Essential Hypercholesterolemia     Morbid Obesity     Major Depression, Recurrent     Obstructive Sleep Apnea     Snoring (Symptom)     Anxiety     Pes planus (flat feet)     Depression     Chronic idiopathic gout of foot, unspecified laterality     Current Outpatient Medications   Medication Sig Dispense Refill     allopurinol  "(ZYLOPRIM) 300 MG tablet TAKE 2 TABLETS(600 MG) BY MOUTH DAILY 60 tablet 0     SYNTHROID 150 mcg tablet Take 1 tablet (150 mcg total) by mouth daily. As directed (Patient taking differently: Take 175 mcg by mouth daily. As directed) 90 tablet 3     venlafaxine (EFFEXOR-XR) 75 MG 24 hr capsule Take 1 capsule (75 mg total) by mouth daily. 90 capsule 3     allopurinol (ZYLOPRIM) 300 MG tablet Take 2 tablets (600 mg total) by mouth daily. 180 tablet 0     colchicine (COLCRYS) 0.6 mg tablet Take 1 tablet (0.6 mg total) by mouth daily. 90 tablet 0     No current facility-administered medications for this visit.      DETAILED EXAMINATION  01/31/20  :  Vitals:    01/31/20 0724   BP: 132/80   Patient Site: Right Arm   Patient Position: Sitting   Cuff Size: Adult Large   Weight: (!) 330 lb (149.7 kg)   Height: 5' 11\" (1.803 m)     Alert oriented. Head including the face is examined for malar rash, heliotropes, scarring, lupus pernio. Eyes examined for redness such as in episcleritis/scleritis, periorbital lesions.   Neck/ Face examined for parotid gland swelling, range of motion of neck.  Left upper and lower and right upper and lower extremities examined for tenderness, swelling, warmth of the appendicular joints, range of motion, edema, rash.  Some of the important findings included: There is no acutely inflamed joint amongst the upper or lower extremity palpable joints.  Full range of motion of the shoulders.  No tophi such as around the olecranon area dorsum of the hands.    LAB / IMAGING DATA:  ALT   Date Value Ref Range Status   01/20/2020 99 (H) 0 - 45 U/L Final   11/18/2019 70 (H) 0 - 45 U/L Final   09/12/2019 75 (H) 0 - 45 U/L Final     Albumin   Date Value Ref Range Status   05/25/2016 3.7 3.5 - 5.0 g/dL Final   11/13/2013 4.1 3.5 - 5.0 g/dL Final   11/02/2012 4.1 3.5 - 5.0 g/dL Final     Creatinine   Date Value Ref Range Status   01/20/2020 1.12 0.70 - 1.30 mg/dL Final   11/18/2019 1.17 0.70 - 1.30 mg/dL Final "   09/12/2019 0.87 0.70 - 1.30 mg/dL Final       WBC   Date Value Ref Range Status   01/20/2020 7.1 4.0 - 11.0 thou/uL Final   11/18/2019 7.1 4.0 - 11.0 thou/uL Final   04/16/2015 7.2 4.0 - 11.0 thou/uL Final     Hemoglobin   Date Value Ref Range Status   01/20/2020 15.6 14.0 - 18.0 g/dL Final   11/18/2019 16.4 14.0 - 18.0 g/dL Final   09/12/2019 16.8 14.0 - 18.0 g/dL Final     Platelets   Date Value Ref Range Status   01/20/2020 161 140 - 440 thou/uL Final   11/18/2019 150 140 - 440 thou/uL Final   09/12/2019 180 140 - 440 thou/uL Final       No results found for: JORDAN, RF, SEDRATE

## 2021-06-07 NOTE — TELEPHONE ENCOUNTER
Dr Pulliam, this patient has a lab appt on 04/02, should he keep this or can it wait until after 05/01? We are now scrubbing our schedules.

## 2021-06-10 NOTE — PROGRESS NOTES
1st attempt   LVMTCB- to go through rooming questions. Will try again later     Aline Alford CMA [facility] 8/10/2020 1:50 PM

## 2021-06-10 NOTE — PROGRESS NOTES
"Freedom Schmitz is a 40 y.o. male who is being evaluated via a billable telephone visit.      The patient has been notified of following:     \"This telephone visit will be conducted via a call between you and your physician/provider. We have found that certain health care needs can be provided without the need for a physical exam.  This service lets us provide the care you need with a short phone conversation.  If a prescription is necessary we can send it directly to your pharmacy.  If lab work is needed we can place an order for that and you can then stop by our lab to have the test done at a later time.    Telephone visits are billed at different rates depending on your insurance coverage. During this emergency period, for some insurers they may be billed the same as an in-person visit.  Please reach out to your insurance provider with any questions.    If during the course of the call the physician/provider feels a telephone visit is not appropriate, you will not be charged for this service.\"    Patient has given verbal consent to a Telephone visit? Yes    What phone number would you like to be contacted at? [phone]    Patient would like to receive their AVS by AVS Preference: Jeimy.      ASSESSMENT AND PLAN:    Diagnoses and all orders for this visit:    Chronic idiopathic gout of foot, unspecified laterality  -     allopurinoL (ZYLOPRIM) 300 MG tablet; TAKE 2 TABLETS(600 MG) BY MOUTH DAILY  Dispense: 180 tablet; Refill: 0    Morbid Obesity    Serum Enzyme Levels - ALT (SGPT) Elevated          HISTORY OF PRESENTING ILLNESS:  Freedom Schmitz 40 y.o. is evaluated here via phone link.  This is for follow-up.  He has gout, this was diagnosed based on clinical criteria.  He has done well as far as gout flareup concerned over the past couple of years has not had any significant flareup.  He has tolerated allopurinol nicely, he is taking 600 mg daily.  His colchicine has been gradually reduced.  He is now on 1 " every other day.  His liver function studies which have been abnormal going as far back as 2010 have gotten worse.  It is conceivable that his BMI has a contributory role here.  His alcohol intake is no more than 1 beer per week having seen his father's alcoholism problems he is not all that keen on drinking anyway.  I have asked him to check with his primary physician on this.  He is going to have the labs drawn in 2 weeks.  He is going to continue allopurinol as now.  He will discontinue colchicine.  . ROS enquiry held for fever, ocular symptoms, rash, headache,  GI issues.  Today we also discussed the issues related to the current pandemic, the pros and cons of the current treatment plan, the CDC guidelines such as social distancing washing the hands covering the cough.  ALLERGIES:Patient has no known allergies.    PAST MEDICAL/ACTIVE PROBLEMS/MEDICATION/SOCIAL DATA  Past Medical History:   Diagnosis Date     Depression      Social History     Tobacco Use   Smoking Status Never Smoker   Smokeless Tobacco Never Used     Patient Active Problem List   Diagnosis     Epistaxis     Serum Enzyme Levels - ALT (SGPT) Elevated     Blood Pressure Isolated Elevated     Hypothyroidism     Essential Hypercholesterolemia     Morbid Obesity     Major Depression, Recurrent     Obstructive Sleep Apnea     Snoring (Symptom)     Anxiety     Pes planus (flat feet)     Depression     Chronic idiopathic gout of foot, unspecified laterality     Current Outpatient Medications   Medication Sig Dispense Refill     allopurinoL (ZYLOPRIM) 300 MG tablet TAKE 2 TABLETS(600 MG) BY MOUTH DAILY 180 tablet 0     SYNTHROID 150 mcg tablet Take 1 tablet (150 mcg total) by mouth daily. As directed (Patient taking differently: Take 175 mcg by mouth daily. As directed) 90 tablet 3     venlafaxine (EFFEXOR-XR) 75 MG 24 hr capsule Take 1 capsule (75 mg total) by mouth daily. 90 capsule 3     colchicine (COLCRYS) 0.6 mg tablet Take 1 tablet (0.6 mg total)  by mouth every other day. 45 tablet 0     No current facility-administered medications for this visit.          EXAMINATION: He sounded comfortable alert oriented speech is fluent he did not recall and memory appeared intact.  He did not sound like he was in pain.  LAB / IMAGING DATA:  ALT   Date Value Ref Range Status   08/06/2020 112 (H) 0 - 45 U/L Final   06/04/2020 95 (H) 0 - 45 U/L Final   04/02/2020 92 (H) 0 - 45 U/L Final     Albumin   Date Value Ref Range Status   08/06/2020 4.0 3.5 - 5.0 g/dL Final   06/04/2020 3.9 3.5 - 5.0 g/dL Final   04/02/2020 3.8 3.5 - 5.0 g/dL Final     Creatinine   Date Value Ref Range Status   08/06/2020 0.88 0.70 - 1.30 mg/dL Final   06/04/2020 0.95 0.70 - 1.30 mg/dL Final   04/02/2020 0.79 0.70 - 1.30 mg/dL Final       WBC   Date Value Ref Range Status   08/06/2020 6.7 4.0 - 11.0 thou/uL Final   06/04/2020 8.6 4.0 - 11.0 thou/uL Final   04/16/2015 7.2 4.0 - 11.0 thou/uL Final     Hemoglobin   Date Value Ref Range Status   08/06/2020 15.9 14.0 - 18.0 g/dL Final   06/04/2020 16.2 14.0 - 18.0 g/dL Final   04/02/2020 16.3 14.0 - 18.0 g/dL Final     Platelets   Date Value Ref Range Status   08/06/2020 171 140 - 440 thou/uL Final   06/04/2020 172 140 - 440 thou/uL Final   04/02/2020 171 140 - 440 thou/uL Final       No results found for: JORDAN, RF, SEDRATE  Duration of the call:6  Minutes  Call start: 416  pm  Call end:   421pm

## 2021-06-10 NOTE — PROGRESS NOTES
2nd  attempt   LVMTCB- to go through rooming questions. Will try again later     Aline Alford CMA [facility] 8/10/2020 3:02 PM

## 2021-06-11 NOTE — PROGRESS NOTES
"Assessment:    1. Change in stool  HM2(CBC w/o Differential)   2. Hypothyroidism, unspecified type  Thyroid Stimulating Hormone (TSH)   3. Gout  Uric Acid    Basic Metabolic Panel   4. Mild episode of recurrent major depressive disorder           Plan:    Dark soft stools described.  Did have indomethacin 2 days ago.  Denies epigastric tenderness.  No history of GI bleed.  Likely component of gastroenteritis currently.  Will check CBC to ensure no evidence for anemia.  Notify persistent concerns or worsening.  Avoid NSAIDs.  Check TSH will on levothyroxine 175 mcg daily.  Uric acid and basic metabolic with history of gout.  Consider prednisone if gout flare in place of NSAID use.  Continue venlafaxine extended release 75 mg daily for depression management, stable with PHQ 9 questionnaire 3 out of 27 and EMILY 7 questionnaire 1 out of 21.  Patient will notify me if continued right ring finger sensitivity noted as a result to light trauma.        Subjective:    Freedom Schmitz is seen today for change in stool color.  Dark color.  Soft stool this morning noted at work however toilet auto flushes and was a unable to inspect closer.  Had normal bowel movement earlier this morning upon before going to work.  Patient denies history of GI bleed.  No epigastric abdominal pain.  Did use indomethacin 50 mg once perhaps 2 days ago however last time use more than 1 tablet was perhaps a month and a half ago.  Denies history of gastritis.  Depression remains stable on venlafaxine.  Continues levothyroxine 175 mcg daily.     \"Hyacinth\" x 3/4/06   1 daughter - \"Marilin\"   1 son - Freedom SCHAFFER aka \"Danny\"   Mom -   Dad - HTN   1 younger sis - ? 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)   Occ EtOH (1 x q2 months)   No tobacco   Surgeries: Lasik bilateral eyes; wisdom teeth; vasectomy   PHQ-9 score=4/27 (5/21/10)   CPAP at 10 cm H2O    Past Surgical History:   Procedure Laterality Date "     VASECTOMY       WISDOM TOOTH EXTRACTION          Family History   Problem Relation Age of Onset     Hypertension Father      Sleep apnea Father      Heart disease Maternal Grandmother      Heart disease Paternal Grandmother         Past Medical History:   Diagnosis Date     Depression         Social History   Substance Use Topics     Smoking status: Never Smoker     Smokeless tobacco: None     Alcohol use No        Current Outpatient Prescriptions   Medication Sig Dispense Refill     indomethacin (INDOCIN) 50 MG capsule Take 1 capsule (50 mg total) by mouth 3 (three) times a day as needed. Take with food. 30 capsule 0     SYNTHROID 175 mcg tablet TAKE 1 TABLET BY MOUTH EVERY DAY AS DIRECTED 30 tablet 0     venlafaxine (EFFEXOR-XR) 75 MG 24 hr capsule TAKE 1 CAPSULE BY MOUTH ONCE DAILY. 30 capsule 0     No current facility-administered medications for this visit.           Objective:    Vitals:    07/05/17 1442   BP: (!) 138/100   Pulse: 72   Weight: (!) 295 lb (133.8 kg)      Body mass index is 41.73 kg/(m^2).    Alert.  No apparent distress.  Chest clear.  Cardiac exam regular.  Abdomen benign, positive bowel sounds.  No guarding or rebound.  Right ring finger without obvious deformity surrounding nail without evidence for paronychia, drainage, etc.

## 2021-06-15 NOTE — PROGRESS NOTES
Freedom Schmitz is a 40 y.o. male who is being evaluated via a billable telephone visit.      What phone number would you like to be contacted at? [phone]  How would you like to obtain your AVS? AVS Preference: Mail a copy.  Phone call duration: 5 minutes    This document was created using a software with less than 100% fidelity, at times resulting in unintended, even erroneous syntax and grammar.  The reader is advised to keep this under consideration while reviewing, interpreting this note.           ASSESSMENT AND PLAN:    Diagnoses and all orders for this visit:    Chronic idiopathic gout of foot, unspecified laterality  -     Uric Acid; Standing  -     HM2(CBC w/o Differential); Standing  -     ALT (SGPT); Standing  -     Creatinine; Standing  -     allopurinoL (ZYLOPRIM) 300 MG tablet; TAKE 2 TABLETS(600 MG) BY MOUTH DAILY  Dispense: 180 tablet; Refill: 0    Morbid Obesity    Serum Enzyme Levels - ALT (SGPT) Elevated  -     Uric Acid; Standing  -     HM2(CBC w/o Differential); Standing  -     ALT (SGPT); Standing  -     Creatinine; Standing          HISTORY OF PRESENTING ILLNESS:  Freedom Schmitz 40 y.o. is evaluated here via audio link.  Follow-up.  He has, polyarticular, finally under good control, he has significant weight issues, he has persistently elevated liver function studies, he has not a flareup in the past 6 months he is due for labs which are emphasized especially given the liver abnormalities.  He will be coming in in the next few days for that.  He is to stay the course.  We will meet here in 6 months.         ROS enquiry held for fever, ocular symptoms, rash, headache,  GI issues.  Today we also discussed the issues related to the current pandemic, the pros and cons of the current treatment plan, the CDC guidelines such as social distancing washing the hands covering the cough.  ALLERGIES:Patient has no known allergies.    PAST MEDICAL/ACTIVE PROBLEMS/MEDICATION/SOCIAL DATA  Past Medical  History:   Diagnosis Date     Depression      Social History     Tobacco Use   Smoking Status Never Smoker   Smokeless Tobacco Never Used     Patient Active Problem List   Diagnosis     Epistaxis     Serum Enzyme Levels - ALT (SGPT) Elevated     Blood Pressure Isolated Elevated     Hypothyroidism     Essential Hypercholesterolemia     Morbid Obesity     Major Depression, Recurrent     Obstructive Sleep Apnea     Snoring (Symptom)     Anxiety     Pes planus (flat feet)     Depression     Chronic idiopathic gout of foot, unspecified laterality     Current Outpatient Medications   Medication Sig Dispense Refill     allopurinoL (ZYLOPRIM) 300 MG tablet TAKE 2 TABLETS(600 MG) BY MOUTH DAILY 180 tablet 0     SYNTHROID 150 mcg tablet Take 1 tablet (150 mcg total) by mouth daily. As directed (Patient taking differently: Take 175 mcg by mouth daily. As directed) 90 tablet 3     venlafaxine (EFFEXOR-XR) 75 MG 24 hr capsule Take 1 capsule (75 mg total) by mouth daily. 90 capsule 3     No current facility-administered medications for this visit.          EXAMINATION:     On the phone sounded comfortable, alert, oriented, speech was fluent,  memory recall appeared normal,  did not sound like she was in pain or short of breath.      LAB / IMAGING DATA:  ALT   Date Value Ref Range Status   08/06/2020 112 (H) 0 - 45 U/L Final   06/04/2020 95 (H) 0 - 45 U/L Final   04/02/2020 92 (H) 0 - 45 U/L Final     Albumin   Date Value Ref Range Status   08/06/2020 4.0 3.5 - 5.0 g/dL Final   06/04/2020 3.9 3.5 - 5.0 g/dL Final   04/02/2020 3.8 3.5 - 5.0 g/dL Final     Creatinine   Date Value Ref Range Status   08/06/2020 0.88 0.70 - 1.30 mg/dL Final   06/04/2020 0.95 0.70 - 1.30 mg/dL Final   04/02/2020 0.79 0.70 - 1.30 mg/dL Final       WBC   Date Value Ref Range Status   08/06/2020 6.7 4.0 - 11.0 thou/uL Final   06/04/2020 8.6 4.0 - 11.0 thou/uL Final   04/16/2015 7.2 4.0 - 11.0 thou/uL Final     Hemoglobin   Date Value Ref Range Status    08/06/2020 15.9 14.0 - 18.0 g/dL Final   06/04/2020 16.2 14.0 - 18.0 g/dL Final   04/02/2020 16.3 14.0 - 18.0 g/dL Final     Platelets   Date Value Ref Range Status   08/06/2020 171 140 - 440 thou/uL Final   06/04/2020 172 140 - 440 thou/uL Final   04/02/2020 171 140 - 440 thou/uL Final       No results found for: JORDAN, RF, SEDRATE

## 2021-06-16 PROBLEM — M1A.0790: Status: ACTIVE | Noted: 2019-06-12

## 2021-06-16 PROBLEM — F32.A DEPRESSION: Status: ACTIVE | Noted: 2018-06-14

## 2021-06-18 NOTE — PROGRESS NOTES
Assessment/Plan:    1. Left foot pain  X-ray is not indicative of any obvious fracture.  Will have radiology confirm this.  I suspect that recent foot pain is due to gout flare.  Will check uric acid level today as well and follow-up with patient regarding these results when available.  Given that pain is improving current regimen, I advised patient to continue with use of indomethacin for pain management.  He should notify clinic if symptoms worsen or fail to improve.  He understands and is comfortable with this plan.  - XR Foot Left 3 or More VWS; Future  - Uric Acid    2. Depression  Stable, will continue with venlafaxine 75 mg once daily.  - venlafaxine (EFFEXOR-XR) 75 MG 24 hr capsule; Take 1 capsule (75 mg total) by mouth daily.  Dispense: 90 capsule; Refill: 3    3. Hypothyroidism  We will check thyroid cascade today.  Patient should continue Synthroid 150 MCG daily, will adjust dose as needed pending results of labs.  - SYNTHROID 150 mcg tablet; Take 1 tablet (150 mcg total) by mouth daily. As directed  Dispense: 90 tablet; Refill: 3    4. Gout  Prescription sent for indomethacin 50 mg.  Patient should continue use of this during suspected gout flare.  - indomethacin (INDOCIN) 50 MG capsule; TAKE 1 CAPSULE(50 MG) BY MOUTH THREE TIMES DAILY WITH FOOD AS NEEDED  Dispense: 30 capsule; Refill: 1  - Uric Acid      Subjective:    Freedom Schmitz is a 37 year old male seen today for evaluation of left foot pain.  Patient reports that he developed pain about a week ago.  Feels like an episode of gout flareup.  Has had several gout flares in the past.  Last episode occurred in his right foot.  Pain came on somewhat suddenly without any injury that he can recall.  Noticed that his left toes became somewhat swollen and red.  Pain worsened with weightbearing and walking. He does not recall any dietary triggers prior to this.  He is aware of his triggers.  Patient wonders about a stress fracture.  He would like a foot  "x-ray to make sure this is not the cause.  However, feels fairly confident that it is likely gout causing the symptoms.  He has been taking indomethacin which has helped improve the pain.  Patient does not have any additional concerns today. Review of systems is as stated in HPI, and the remainder of the 10 system review is otherwise unremarkable.    Past Medical History, Family History, and Social History reviewed.   \"Hyacinth\" x 3/4/06   1 daughter - \"Marilin\"   1 son - Freedom III aka \"Danny\"   Mom -   Dad - HTN   1 younger sis - ? 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)   No tobacco   Surgeries: Lasik bilateral eyes; wisdom teeth; vasectomy   PHQ-9 score=4/27 (5/21/10)   CPAP at 10 cm H2O    Past Surgical History:   Procedure Laterality Date     VASECTOMY       WISDOM TOOTH EXTRACTION          Family History   Problem Relation Age of Onset     Hypertension Father      Sleep apnea Father      Heart disease Maternal Grandmother      Heart disease Paternal Grandmother         Past Medical History:   Diagnosis Date     Depression         Social History   Substance Use Topics     Smoking status: Never Smoker     Smokeless tobacco: Not on file     Alcohol use No        Current Outpatient Prescriptions   Medication Sig Dispense Refill     indomethacin (INDOCIN) 50 MG capsule TAKE 1 CAPSULE(50 MG) BY MOUTH THREE TIMES DAILY WITH FOOD AS NEEDED 30 capsule 1     SYNTHROID 150 mcg tablet Take 1 tablet (150 mcg total) by mouth daily. As directed 90 tablet 3     venlafaxine (EFFEXOR-XR) 75 MG 24 hr capsule Take 1 capsule (75 mg total) by mouth daily. 90 capsule 3     No current facility-administered medications for this visit.           Objective:    Vitals:    06/14/18 1508   BP: 136/82   Patient Site: Left Arm   Patient Position: Sitting   Cuff Size: Adult Large   Pulse: 86   SpO2: 93%   Weight: (!) 304 lb (137.9 kg)   Height: 5' 11\" (1.803 m) "      Body mass index is 42.4 kg/(m^2).      General Appearance:  Alert, cooperative, no distress, appears stated age   Lungs:   Clear to auscultation bilaterally, respirations unlabored.    Heart:  Regular rate and rhythm, S1, S2 normal.   Extremities:  Left foot without obvious deformity.  Mild erythema on dorsal aspect of 2nd-4th toes.  No ecchymosis.  No tenderness on palpation.  Full strength, range of motion, sensation.  Extremities otherwise normal.     Skin: Warm, dry.  Skin color, texture, turgor normal, no rashes or lesions   Neurologic: Nonfocal.           This note has been dictated using voice recognition software. Any grammatical or context distortions are unintentional and inherent to the use of this software.

## 2021-06-21 NOTE — LETTER
Letter by Brandi Pulliam [employer] at      Author: Brandi Pulliam [employer] Service: -- Author Type: --    Filed:  Encounter Date: 3/3/2021 Status: (Other)         Freedom Schmitz  [id number] [facility] [address]       March 3, 2021         Dear Mr. Schmitz,    Below are the results from your recent visit:    Resulted Orders   Uric Acid   Result Value Ref Range    Uric Acid 4.6 3.0 - 8.0 mg/dL   HM2(CBC w/o Differential)   Result Value Ref Range    WBC 7.5 4.0 - 11.0 thou/uL    RBC 5.64 4.40 - 6.20 mill/uL    Hemoglobin 16.0 14.0 - 18.0 g/dL    Hematocrit 46.1 40.0 - 54.0 %    MCV 82 80 - 100 fL    MCH 28.4 27.0 - 34.0 pg    MCHC 34.7 32.0 - 36.0 g/dL    RDW 13.1 11.0 - 14.5 %    Platelets 189 140 - 440 thou/uL    MPV 11.9 (H) 7.0 - 10.0 fL   ALT (SGPT)   Result Value Ref Range    ALT 58 (H) 0 - 45 U/L   Creatinine   Result Value Ref Range    Creatinine 0.85 0.70 - 1.30 mg/dL    GFR MDRD Af Amer >60 >60 mL/min/1.73m2    GFR MDRD Non Af Amer >60 >60 mL/min/1.73m2      Labs not normal, but remain stable since previous check.     Sincerely,      Electronically signed by SONJA Richardson

## 2021-06-23 NOTE — PROGRESS NOTES
ASSESSMENT AND PLAN:  Freedom Schmitz 38 y.o. male is seen here on 01/18/19 for evaluation of joint pains.  He has several features which are consistent with gout.  We discussed how the diagnosis is arrived that.  His diagnoses would be considered a clinical based.  He has reached a point where he requires and is agreeable to preventive treatment.  The lifelong comanagement was discussed.  The risk of flareups initially outlined.  He is going to start colchicine and has allopurinol from his primary physician already and going to start 100 mg daily for 2 weeks then increase to 200 mg for the next 2 weeks and then 300 mg daily.  Ultrasound examination of the first MTPs did not show double contour sign.  He very likely has erosions on the head of the first metatarsal bone.  This is not apparent on the x-rays.  Check labs thereafter and return for follow-up in 6-8 weeks.  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  Accompanied by his wife.  He reports that the symptoms began 2 years ago when he was 36.  He recalls that his right foot big toe had become acutely inflamed so much so that he could not put on his usual shoes and went to the emergency room in slippers.  They both recall to this day how the ER physician there almost made fun of his slippers telling him to wear proper shoes and not realizing how painful and swollen his foot was.  Since then he has had episodes usually in fall and winter.  However over the past several months he has been getting almost weekly flareups.  He has had these and the  first MTPs and at times in the ankles and knees.  He has never had involvement of the upper extremity joints.  He is kidney stone but is not sure the composition of that.  He reports no family history of gout.  No family history of lupus rheumatoid arthritis ankylosing spondylitis.  Today is a good day for him although mild discomfort yet in the right knee.  He has been able to continue most of his day-to-day activities with some exceptions as noted.  He reports fatigue he is not a smoker he has given up on many of the dietary ingredients such as soda, or any juices, lobster, and others.  He takes minimal alcohol intake.  Further historical information and ADL limitations as noted in the multidimensional health assessment questionnaire attached in the EMR. Rest of the 13 system ROS is negative.     ALLERGIES:Patient has no known allergies.    PAST MEDICAL/ACTIVE PROBLEMS/MEDICATION/ FAMILY HISTORY/SOCIAL DATA:  The patient has a family history of  Past Medical History:   Diagnosis Date     Depression      Social History     Tobacco Use   Smoking Status Never Smoker   Smokeless Tobacco Never Used     Patient Active Problem List   Diagnosis     Epistaxis     Serum Enzyme Levels - ALT (SGPT) Elevated     Blood Pressure Isolated Elevated     Hypothyroidism     Essential Hypercholesterolemia     Morbid Obesity     Major Depression, Recurrent     Obstructive Sleep Apnea     Asymptomatic Hyperuricemia     Snoring (Symptom)     Acute Gout     Anxiety     Pes planus (flat feet)     Gout     Left foot pain     Depression     Current Outpatient Medications   Medication Sig Dispense Refill     SYNTHROID 150 mcg tablet Take 1 tablet (150 mcg total) by mouth daily. As directed 90 tablet 3     venlafaxine (EFFEXOR-XR) 75 MG 24 hr capsule Take 1 capsule (75 mg total) by mouth daily. 90 capsule 3     indomethacin (INDOCIN) 50 MG capsule TAKE 1 CAPSULE(50 MG) BY MOUTH THREE TIMES DAILY WITH FOOD AS NEEDED 30 capsule 1     No current  "facility-administered medications for this visit.        COMPREHENSIVE EXAMINATION:  Vitals:    01/18/19 1505   BP: (!) 122/92   Patient Site: Right Arm   Patient Position: Sitting   Cuff Size: Adult Large   Pulse: 80   Weight: (!) 304 lb (137.9 kg)   Height: 5' 11\" (1.803 m)     A well appearing alert oriented male. Vital data as noted above. His eyes without inflammation/scleromalacia. ENTwithout oral mucositis, thrush, nasal deformity, external ear redness, deformity. His neck is without lymphadenopathy and supple. Lungs normal sounds, no pleural rub. Heart auscultation normal rate, rhythm; no pericardial rub and murmurs. Abdomen soft, non tender, no organomegaly. Skin examined for heliotrope, malar area eruption, lupus pernio, periungual erythema, sclerodactyly, papules, erythema nodosum, purpura, nail pitting, onycholysis, and obvious psoriasis lesion. Neurological examination shows normal alertness, speech, facial symmetry, tone and power in upper and lower extremities, Tinel's and Phalen's at wrist and gait. The joint examination is performed for swelling, tenderness, warmth, erythema, and range of motion in the following joints: DIPs, PIPs, MCPs, wrists, first CMC's, elbows, shoulders, hips, knees, ankles, feet; spine for range of motion and paraspinal muscles for tenderness. The salient normal / abnormal findings are appended.  He has mild discoloration of the right first MTP however none of his palpable joints are inflamed.  He does not have visible tophi.  Examination of the first MTPs did not show double contour sign.  He does however have erosions such as on the right first MTP as visualized on the ultrasound.    LAB / IMAGING DATA:  ALT   Date Value Ref Range Status   05/25/2016 38 0 - 45 U/L Final   11/13/2013 42 <46 IU/L Final   11/02/2012 64 (H) <46 IU/L Final     Albumin   Date Value Ref Range Status   05/25/2016 3.7 3.5 - 5.0 g/dL Final   11/13/2013 4.1 3.5 - 5.0 g/dL Final   11/02/2012 4.1 3.5 - " 5.0 g/dL Final     Creatinine   Date Value Ref Range Status   07/05/2017 0.86 0.70 - 1.30 mg/dL Final   05/25/2016 0.84 0.70 - 1.30 mg/dL Final   04/16/2015 0.84 0.70 - 1.30 mg/dL Final       WBC   Date Value Ref Range Status   07/05/2017 6.7 4.0 - 11.0 thou/uL Final   05/25/2016 5.5 4.0 - 11.0 thou/uL Final   04/16/2015 7.2 4.0 - 11.0 thou/uL Final     Hemoglobin   Date Value Ref Range Status   07/05/2017 16.9 14.0 - 18.0 g/dL Final   05/25/2016 16.3 14.0 - 18.0 g/dL Final   04/16/2015 15.8 14.0 - 18.0 g/dL Final     Platelets   Date Value Ref Range Status   07/05/2017 151 140 - 440 thou/uL Final   05/25/2016 171 140 - 440 thou/uL Final   04/16/2015 177 140 - 440 thou/uL Final       No results found for: JORDAN, RF, SEDRATE

## 2021-06-24 NOTE — TELEPHONE ENCOUNTER
Patient needs you to call his pharmacy or fax them stating its ok for him to talk 400 mg of Allopurinol per day.  Fax: [phone]

## 2021-06-24 NOTE — TELEPHONE ENCOUNTER
Left message for pt to call back regarding Allopurinol    Per Dr Pulliam last note- pt was to increase gradually up to 300mg daily. Calling to clarify who prescribed pt to take 400mg and to see what dose pt is currently taking

## 2021-06-27 ENCOUNTER — HEALTH MAINTENANCE LETTER (OUTPATIENT)
Age: 41
End: 2021-06-27

## 2021-10-16 ENCOUNTER — HEALTH MAINTENANCE LETTER (OUTPATIENT)
Age: 41
End: 2021-10-16

## 2021-11-18 PROCEDURE — 88305 TISSUE EXAM BY PATHOLOGIST: CPT | Mod: TC,ORL | Performed by: ORTHOPAEDIC SURGERY

## 2021-11-19 ENCOUNTER — LAB REQUISITION (OUTPATIENT)
Dept: LAB | Facility: CLINIC | Age: 41
End: 2021-11-19
Payer: COMMERCIAL

## 2021-11-23 LAB
PATH REPORT.COMMENTS IMP SPEC: NORMAL
PATH REPORT.COMMENTS IMP SPEC: NORMAL
PATH REPORT.FINAL DX SPEC: NORMAL
PATH REPORT.GROSS SPEC: NORMAL
PATH REPORT.MICROSCOPIC SPEC OTHER STN: NORMAL
PATH REPORT.RELEVANT HX SPEC: NORMAL
PHOTO IMAGE: NORMAL

## 2021-11-23 PROCEDURE — 88307 TISSUE EXAM BY PATHOLOGIST: CPT | Mod: 26 | Performed by: PATHOLOGY

## 2022-07-23 ENCOUNTER — HEALTH MAINTENANCE LETTER (OUTPATIENT)
Age: 42
End: 2022-07-23

## 2022-10-01 ENCOUNTER — HEALTH MAINTENANCE LETTER (OUTPATIENT)
Age: 42
End: 2022-10-01

## 2023-08-06 ENCOUNTER — HEALTH MAINTENANCE LETTER (OUTPATIENT)
Age: 43
End: 2023-08-06